# Patient Record
Sex: FEMALE | Race: WHITE | NOT HISPANIC OR LATINO
[De-identification: names, ages, dates, MRNs, and addresses within clinical notes are randomized per-mention and may not be internally consistent; named-entity substitution may affect disease eponyms.]

---

## 2017-04-18 ENCOUNTER — MOBILE ON CALL (OUTPATIENT)
Age: 22
End: 2017-04-18

## 2017-04-18 ENCOUNTER — MEDICATION RENEWAL (OUTPATIENT)
Age: 22
End: 2017-04-18

## 2017-05-24 ENCOUNTER — LABORATORY RESULT (OUTPATIENT)
Age: 22
End: 2017-05-24

## 2017-05-24 PROBLEM — Z01.419 VISIT FOR GYNECOLOGIC EXAMINATION: Status: ACTIVE | Noted: 2017-05-24

## 2017-05-25 ENCOUNTER — APPOINTMENT (OUTPATIENT)
Dept: FAMILY MEDICINE | Facility: CLINIC | Age: 22
End: 2017-05-25

## 2017-05-25 VITALS
WEIGHT: 175 LBS | DIASTOLIC BLOOD PRESSURE: 66 MMHG | BODY MASS INDEX: 24.77 KG/M2 | HEIGHT: 70.5 IN | HEART RATE: 71 BPM | TEMPERATURE: 97.8 F | OXYGEN SATURATION: 99 % | SYSTOLIC BLOOD PRESSURE: 100 MMHG | RESPIRATION RATE: 12 BRPM

## 2017-05-25 DIAGNOSIS — Z20.9 CONTACT WITH AND (SUSPECTED) EXPOSURE TO UNSPECIFIED COMMUNICABLE DISEASE: ICD-10-CM

## 2017-05-25 DIAGNOSIS — Z00.00 ENCOUNTER FOR GENERAL ADULT MEDICAL EXAMINATION W/OUT ABNORMAL FINDINGS: ICD-10-CM

## 2017-05-25 DIAGNOSIS — H01.005 UNSPECIFIED BLEPHARITIS RIGHT LOWER EYELID: ICD-10-CM

## 2017-05-25 DIAGNOSIS — Z01.419 ENCOUNTER FOR GYNECOLOGICAL EXAMINATION (GENERAL) (ROUTINE) W/OUT ABNORMAL FINDINGS: ICD-10-CM

## 2017-05-25 DIAGNOSIS — Z87.440 PERSONAL HISTORY OF URINARY (TRACT) INFECTIONS: ICD-10-CM

## 2017-05-25 DIAGNOSIS — Z87.42 PERSONAL HISTORY OF OTHER DISEASES OF THE FEMALE GENITAL TRACT: ICD-10-CM

## 2017-05-25 DIAGNOSIS — J35.01 CHRONIC TONSILLITIS: ICD-10-CM

## 2017-05-25 DIAGNOSIS — J03.90 ACUTE TONSILLITIS, UNSPECIFIED: ICD-10-CM

## 2017-05-25 DIAGNOSIS — D22.9 MELANOCYTIC NEVI, UNSPECIFIED: ICD-10-CM

## 2017-05-25 DIAGNOSIS — J35.1 HYPERTROPHY OF TONSILS: ICD-10-CM

## 2017-05-25 DIAGNOSIS — H01.002 UNSPECIFIED BLEPHARITIS RIGHT LOWER EYELID: ICD-10-CM

## 2017-05-25 DIAGNOSIS — Z23 ENCOUNTER FOR IMMUNIZATION: ICD-10-CM

## 2017-05-25 DIAGNOSIS — J03.91 ACUTE RECURRENT TONSILLITIS, UNSPECIFIED: ICD-10-CM

## 2017-05-25 RX ORDER — NORETHINDRONE ACETATE AND ETHINYL ESTRADIOL, ETHINYL ESTRADIOL AND FERROUS FUMARATE 1MG-10(24)
1 MG-10 MCG / KIT ORAL
Qty: 84 | Refills: 3 | Status: DISCONTINUED | COMMUNITY
Start: 2017-04-18 | End: 2017-05-25

## 2017-05-25 RX ORDER — SULFACETAMIDE SODIUM AND PREDNISOLONE ACETATE 100; 2 MG/G; MG/G
10-0.2 OINTMENT OPHTHALMIC TWICE DAILY
Qty: 1 | Refills: 0 | Status: ACTIVE | COMMUNITY
Start: 2017-05-25 | End: 1900-01-01

## 2017-05-27 ENCOUNTER — RESULT REVIEW (OUTPATIENT)
Age: 22
End: 2017-05-27

## 2017-05-27 LAB
C TRACH RRNA SPEC QL NAA+PROBE: NORMAL
N GONORRHOEA RRNA SPEC QL NAA+PROBE: NORMAL
SOURCE AMPLIFICATION: NORMAL

## 2017-05-31 ENCOUNTER — RESULT REVIEW (OUTPATIENT)
Age: 22
End: 2017-05-31

## 2017-05-31 ENCOUNTER — MESSAGE (OUTPATIENT)
Age: 22
End: 2017-05-31

## 2017-05-31 DIAGNOSIS — Z11.1 ENCOUNTER FOR SCREENING FOR RESPIRATORY TUBERCULOSIS: ICD-10-CM

## 2017-05-31 LAB — CYTOLOGY CVX/VAG DOC THIN PREP: NORMAL

## 2017-06-01 ENCOUNTER — RESULT REVIEW (OUTPATIENT)
Age: 22
End: 2017-06-01

## 2017-06-05 ENCOUNTER — RESULT REVIEW (OUTPATIENT)
Age: 22
End: 2017-06-05

## 2017-06-05 LAB
ADJUSTED MITOGEN: 9.5 IU/ML
ADJUSTED TB AG: 0 IU/ML
M TB IFN-G BLD-IMP: NEGATIVE
QUANTIFERON GOLD NIL: 0.05 IU/ML

## 2018-03-14 ENCOUNTER — RX RENEWAL (OUTPATIENT)
Age: 23
End: 2018-03-14

## 2018-07-18 ENCOUNTER — EMERGENCY (EMERGENCY)
Facility: HOSPITAL | Age: 23
LOS: 1 days | Discharge: HOME | End: 2018-07-18
Attending: EMERGENCY MEDICINE | Admitting: EMERGENCY MEDICINE
Payer: COMMERCIAL

## 2018-07-18 VITALS
HEART RATE: 81 BPM | OXYGEN SATURATION: 95 % | TEMPERATURE: 99 F | SYSTOLIC BLOOD PRESSURE: 129 MMHG | RESPIRATION RATE: 16 BRPM | WEIGHT: 179.9 LBS | HEIGHT: 71 IN | DIASTOLIC BLOOD PRESSURE: 95 MMHG

## 2018-07-18 DIAGNOSIS — R35.0 FREQUENCY OF MICTURITION: ICD-10-CM

## 2018-07-18 PROCEDURE — 99283 EMERGENCY DEPT VISIT LOW MDM: CPT | Mod: 25

## 2018-07-18 RX ORDER — NITROFURANTOIN MACROCRYSTAL 50 MG
100 CAPSULE ORAL ONCE
Qty: 0 | Refills: 0 | Status: COMPLETED | OUTPATIENT
Start: 2018-07-18 | End: 2018-07-18

## 2018-07-18 RX ORDER — NORETHINDRONE AND ETHINYL ESTRADIOL 0.4-0.035
1 KIT ORAL
Qty: 0 | Refills: 0 | COMMUNITY

## 2018-07-18 RX ORDER — PHENAZOPYRIDINE HCL 100 MG
100 TABLET ORAL ONCE
Qty: 0 | Refills: 0 | Status: COMPLETED | OUTPATIENT
Start: 2018-07-18 | End: 2018-07-18

## 2018-07-18 RX ORDER — IBUPROFEN 200 MG
600 TABLET ORAL ONCE
Qty: 0 | Refills: 0 | Status: COMPLETED | OUTPATIENT
Start: 2018-07-18 | End: 2018-07-18

## 2018-07-18 NOTE — ED PROVIDER NOTE - OBJECTIVE STATEMENT
pt c/o moderate burning on urination x 1 day assoc c increased frequency. no n/v. no fever. no hematuria. no flank pain

## 2018-07-19 LAB
APPEARANCE UR: CLEAR — SIGNIFICANT CHANGE UP
BILIRUB UR-MCNC: NEGATIVE — SIGNIFICANT CHANGE UP
COLOR SPEC: YELLOW — SIGNIFICANT CHANGE UP
DIFF PNL FLD: ABNORMAL
GLUCOSE UR QL: NEGATIVE — SIGNIFICANT CHANGE UP
KETONES UR-MCNC: NEGATIVE — SIGNIFICANT CHANGE UP
LEUKOCYTE ESTERASE UR-ACNC: ABNORMAL
NITRITE UR-MCNC: NEGATIVE — SIGNIFICANT CHANGE UP
PH UR: 6.5 — SIGNIFICANT CHANGE UP (ref 5–8)
PROT UR-MCNC: NEGATIVE — SIGNIFICANT CHANGE UP
SP GR SPEC: 1.01 — SIGNIFICANT CHANGE UP (ref 1.01–1.02)
UROBILINOGEN FLD QL: NEGATIVE — SIGNIFICANT CHANGE UP

## 2018-07-19 PROCEDURE — 87186 SC STD MICRODIL/AGAR DIL: CPT

## 2018-07-19 PROCEDURE — 81001 URINALYSIS AUTO W/SCOPE: CPT

## 2018-07-19 PROCEDURE — 87086 URINE CULTURE/COLONY COUNT: CPT

## 2018-07-19 PROCEDURE — 99283 EMERGENCY DEPT VISIT LOW MDM: CPT

## 2018-07-19 RX ORDER — IBUPROFEN 200 MG
1 TABLET ORAL
Qty: 30 | Refills: 0 | OUTPATIENT
Start: 2018-07-19

## 2018-07-19 RX ORDER — NITROFURANTOIN MACROCRYSTAL 50 MG
1 CAPSULE ORAL
Qty: 14 | Refills: 0 | OUTPATIENT
Start: 2018-07-19 | End: 2018-07-25

## 2018-07-19 RX ORDER — PHENAZOPYRIDINE HCL 100 MG
1 TABLET ORAL
Qty: 6 | Refills: 0 | OUTPATIENT
Start: 2018-07-19 | End: 2018-07-20

## 2018-07-19 RX ADMIN — Medication 100 MILLIGRAM(S): at 00:06

## 2018-07-19 RX ADMIN — Medication 600 MILLIGRAM(S): at 00:06

## 2018-07-21 NOTE — ED POST DISCHARGE NOTE - RESULT SUMMARY
+UCx with E. coli, awaiting sensitivities, pt dc'ed home on Macrobid. +UCx with E. coli, , pt dc'ed home on Macrobid. It is sensitive.

## 2018-08-27 NOTE — ED ADULT NURSE NOTE - FINAL NURSING ELECTRONIC SIGNATURE
[facility] - Tyler [facility]: [employer] 19-Jul-2018 00:34

## 2018-09-01 ENCOUNTER — RX RENEWAL (OUTPATIENT)
Age: 23
End: 2018-09-01

## 2018-09-06 ENCOUNTER — RX RENEWAL (OUTPATIENT)
Age: 23
End: 2018-09-06

## 2019-02-05 ENCOUNTER — RX RENEWAL (OUTPATIENT)
Age: 24
End: 2019-02-05

## 2019-02-05 RX ORDER — NORETHINDRONE ACETATE AND ETHINYL ESTRADIOL, ETHINYL ESTRADIOL AND FERROUS FUMARATE 1MG-10(24)
1 MG-10 MCG / KIT ORAL
Qty: 84 | Refills: 0 | Status: ACTIVE | COMMUNITY
Start: 2018-06-04 | End: 1900-01-01

## 2019-04-30 RX ORDER — NORETHINDRONE ACETATE AND ETHINYL ESTRADIOL, ETHINYL ESTRADIOL AND FERROUS FUMARATE 1MG-10(24)
1 MG-10 MCG / KIT ORAL
Qty: 84 | Refills: 0 | Status: ACTIVE | COMMUNITY
Start: 2019-04-30 | End: 1900-01-01

## 2019-05-14 RX ORDER — NORETHINDRONE ACETATE AND ETHINYL ESTRADIOL, ETHINYL ESTRADIOL AND FERROUS FUMARATE 1MG-10(24)
1 MG-10 MCG / KIT ORAL
Qty: 84 | Refills: 0 | Status: ACTIVE | COMMUNITY
Start: 2019-05-14 | End: 1900-01-01

## 2020-09-03 NOTE — ED ADULT TRIAGE NOTE - NS ED TRIAGE HISTORIAN
Dr Meza discussed results with patient at time of stress test. Can follow up with cardiology as needed.     Stress test 9/3/2020  IMPRESSION   Nonischemic response electrocardiographically   Nonischemic response subjectively   Normal blood pressure response to exercise   Normal heart rate recovery   Excellent exercise capacity   *[facility] Treadmill Score (DTS) = +10   Risk Stratification Based on [place] Treadmill Score   Score One Year Mortality Risk Women Men   5 or greater Low 0.5% 0.9%   +4 to -10 Moderate 1.1% 2.9%   Less than -11 High 1.8% 8.3%                                                                * Exercise Treadmill Score for Predicting Prognosis in Coronary Artery Disease.  Annals of Internal Medicine. June 1987, Vol 106, Number 6.     Nico Meza MD     Office visit 8/18/2020  Assessment:   Heart palpitations- suspect sinus tachycardia, cannot rule out paroxysmal SVT  Near syncope probably due to neurocardiogenic dysfunction  Plan:   Holter monitor-to evaluate SVT.    If patient remains symptomatic despite normal Holter monitor in future may need electrophysiology consult for placement of event monitor loop record  Exercise treadmill stress test  Stay well hydrated  Management of neurocardiogenic dysfunction discussed with the patient in detail.  Avoid standing in one place long time- keep moving  If symptoms continue can consider tilt table test  Cut back on caffeine  Continue current cardiac medical therapy   Medications questions answered  Explained in detail findings and plan  This has been fully explained to the patient, who indicates understanding.  Follow up in cardiology clinic in pending stress test  or sooner if symptomatic   
Patient
